# Patient Record
Sex: FEMALE | Race: WHITE | Employment: OTHER | URBAN - METROPOLITAN AREA
[De-identification: names, ages, dates, MRNs, and addresses within clinical notes are randomized per-mention and may not be internally consistent; named-entity substitution may affect disease eponyms.]

---

## 2019-03-06 DIAGNOSIS — K44.9 HIATAL HERNIA: Primary | ICD-10-CM

## 2019-03-08 ENCOUNTER — TELEPHONE (OUTPATIENT)
Dept: SURGERY | Facility: CLINIC | Age: 74
End: 2019-03-08

## 2019-03-08 NOTE — TELEPHONE ENCOUNTER
ONCOLOGY INTAKE: Records Information      APPT INFORMATION: 5/14/19 at 11:00AM  Referring provider:  Self Referred  Referring provider s clinic:  N/A  Reason for visit/diagnosis:  5 Year Return - Hiatal Hernia, GERD    Were the records received with the referral (via [employer])? No    Has patient been seen for any external appt for this diagnosis (enter clinic/location)? Please confirm with pt if she's been seen locally in [place] for this.

## 2019-05-14 ENCOUNTER — OFFICE VISIT (OUTPATIENT)
Dept: SURGERY | Facility: CLINIC | Age: 74
End: 2019-05-14
Attending: CLINICAL NURSE SPECIALIST
Payer: MEDICARE

## 2019-05-14 ENCOUNTER — ANCILLARY PROCEDURE (OUTPATIENT)
Dept: GENERAL RADIOLOGY | Facility: CLINIC | Age: 74
End: 2019-05-14
Attending: CLINICAL NURSE SPECIALIST
Payer: MEDICARE

## 2019-05-14 VITALS
WEIGHT: 229 LBS | DIASTOLIC BLOOD PRESSURE: 63 MMHG | BODY MASS INDEX: 35.94 KG/M2 | RESPIRATION RATE: 16 BRPM | HEIGHT: 67 IN | TEMPERATURE: 98.7 F | HEART RATE: 84 BPM | SYSTOLIC BLOOD PRESSURE: 125 MMHG | OXYGEN SATURATION: 97 %

## 2019-05-14 DIAGNOSIS — K44.9 HIATAL HERNIA: Primary | ICD-10-CM

## 2019-05-14 DIAGNOSIS — K44.9 HIATAL HERNIA: ICD-10-CM

## 2019-05-14 PROCEDURE — G0463 HOSPITAL OUTPT CLINIC VISIT: HCPCS | Mod: ZF

## 2019-05-14 RX ORDER — LISINOPRIL 5 MG/1
TABLET ORAL
Refills: 0 | COMMUNITY
Start: 2019-02-22

## 2019-05-14 RX ORDER — KETOCONAZOLE 20 MG/G
CREAM TOPICAL
Refills: 3 | COMMUNITY
Start: 2019-03-12

## 2019-05-14 RX ORDER — CELECOXIB 200 MG/1
CAPSULE ORAL
Refills: 3 | COMMUNITY
Start: 2019-02-06

## 2019-05-14 ASSESSMENT — MIFFLIN-ST. JEOR: SCORE: 1576.37

## 2019-05-14 ASSESSMENT — PAIN SCALES - GENERAL: PAINLEVEL: NO PAIN (0)

## 2019-05-14 NOTE — PROGRESS NOTES
"THORACIC SURGERY FOLLOW UP VISIT    Dear Dr. Cooney,  I saw Mrs. Amalia Duffy in follow-up today. The clinical summary follows:     PREOP DIAGNOSIS   Hiatal Hernia  PROCEDURE   Repair of hiatal hernia with redo laparoscopic Nissen fundoplication and Katlyn gastroplasty  DATE OF PROCEDURE  01/24/2008    COMPLICATIONS  None    INTERVAL STUDIES  UGI-final report pending at time of clinic visit. Appears unchanged compared to last one on file from 2013.    ETOH no  TOB never smoker  BMI 35.9    AXEL Holland is doing well. She is eating without difficulty and denies any reflux. She does occasionally have bread get \"stuck\" but this passes easily after she takes a sip of water.    From a personal perspective, she is here alone. She has a bad back and for longer distances, she uses her walker.     IMPRESSION No diagnosis found.  74 year-old female with a hiatal hernia s/p repair    PLAN  I spent a total of 25 minutes with Mrs. Amalia Duffy, more than 50% of which were spent in counseling, coordination of care, and face-to-face time. I reviewed the plan as follows:  She does have a small sliding hiatal hernia on her UGI however, she had this back in 2013. As she is asymptomatic, we do not need to intervene at this time. If she is doing well she does not need to continue following with us. If she develops recurrent symptoms she will contact us.  Necessary Tests & Appointments: prn  Pain Control Plan: N/A  Anticoagulation Plan: N/A  Smoking Cessation: N/A  All questions were answered and the patient and present family were in agreement with the plan.  I appreciate the opportunity to participate in the care of your patient and will keep you updated.  Sincerely,    "

## 2019-05-14 NOTE — NURSING NOTE
"Oncology Rooming Note    May 14, 2019 11:25 AM   Amalia Duffy is a 74 year old female who presents for:    Chief Complaint   Patient presents with     Oncology Clinic Visit     Return Hiatal Hernia; 5 year check     Initial Vitals: /63   Pulse 84   Temp 98.7  F (37.1  C) (Oral)   Resp 16   Ht 1.702 m (5' 7\")   Wt 103.9 kg (229 lb)   SpO2 97%   Breastfeeding? No   BMI 35.87 kg/m   Estimated body mass index is 35.87 kg/m  as calculated from the following:    Height as of this encounter: 1.702 m (5' 7\").    Weight as of this encounter: 103.9 kg (229 lb). Body surface area is 2.22 meters squared.  No Pain (0) Comment: Data Unavailable   No LMP recorded. Patient is postmenopausal.  Allergies reviewed: Yes  Medications reviewed: Yes    Medications: Medication refills not needed today.  Pharmacy name entered into [employer]: [id number] - [place], [place] - [address]     Clinical concerns: No new concerns.         Terri Armstrong CMA              "

## 2019-05-14 NOTE — LETTER
"5/14/2019       RE: Amalia Duffy  [address] [place] [address]     Dear Colleague,    Thank you for referring your patient, Amalia Duffy, to the [facility]. Please see a copy of my visit note below.    THORACIC SURGERY FOLLOW UP VISIT    Dear Dr. Cooney,  I saw Mrs. Amalia Duffy in follow-up today. The clinical summary follows:     PREOP DIAGNOSIS   Hiatal Hernia  PROCEDURE   Repair of hiatal hernia with redo laparoscopic Nissen fundoplication and Katlyn gastroplasty  DATE OF PROCEDURE  01/24/2008    COMPLICATIONS  None    INTERVAL STUDIES  UGI-final report pending at time of clinic visit. Appears unchanged compared to last one on file from 2013.    ETOH no  TOB never smoker  BMI 35.9    SUBJECTIVE  Amalia is doing well. She is eating without difficulty and denies any reflux. She does occasionally have bread get \"stuck\" but this passes easily after she takes a sip of water.    From a personal perspective, she is here alone. She has a bad back and for longer distances, she uses her walker.     IMPRESSION No diagnosis found.  74 year-old female with a hiatal hernia s/p repair    PLAN  I spent a total of 25 minutes with Mrs. Amalia Duffy, more than 50% of which were spent in counseling, coordination of care, and face-to-face time. I reviewed the plan as follows:  She does have a small sliding hiatal hernia on her UGI however, she had this back in 2013. As she is asymptomatic, we do not need to intervene at this time. If she is doing well she does not need to continue following with us. If she develops recurrent symptoms she will contact us.  Necessary Tests & Appointments: prn  Pain Control Plan: N/A  Anticoagulation Plan: N/A  Smoking Cessation: N/A  All questions were answered and the patient and present family were in agreement with the plan.  I appreciate the opportunity to participate in the care of your patient and will keep you " updated.  Sincerely,      Marry Chapa, APRN CNS